# Patient Record
Sex: MALE | Race: WHITE
[De-identification: names, ages, dates, MRNs, and addresses within clinical notes are randomized per-mention and may not be internally consistent; named-entity substitution may affect disease eponyms.]

---

## 2021-02-12 ENCOUNTER — HOSPITAL ENCOUNTER (EMERGENCY)
Dept: HOSPITAL 8 - ED | Age: 51
LOS: 1 days | Discharge: HOME | End: 2021-02-13
Payer: SELF-PAY

## 2021-02-12 VITALS — DIASTOLIC BLOOD PRESSURE: 89 MMHG | SYSTOLIC BLOOD PRESSURE: 132 MMHG

## 2021-02-12 VITALS — BODY MASS INDEX: 27.41 KG/M2 | HEIGHT: 75 IN | WEIGHT: 220.46 LBS

## 2021-02-12 DIAGNOSIS — L03.116: Primary | ICD-10-CM

## 2021-02-12 DIAGNOSIS — F17.210: ICD-10-CM

## 2021-02-12 DIAGNOSIS — M79.89: ICD-10-CM

## 2021-02-12 DIAGNOSIS — M79.605: ICD-10-CM

## 2021-02-12 PROCEDURE — 99284 EMERGENCY DEPT VISIT MOD MDM: CPT

## 2021-02-12 PROCEDURE — 99406 BEHAV CHNG SMOKING 3-10 MIN: CPT

## 2021-02-13 NOTE — NUR
PT ESCORTED OUT WITH SECURITY, THREW DC PAPERS AT SECURITY REFUSED PO ABX AND 
SWUNG AT RN WHILE ATTEMPTING TO REMOVE BP CUFF

## 2021-02-15 ENCOUNTER — HOSPITAL ENCOUNTER (EMERGENCY)
Dept: HOSPITAL 8 - ED | Age: 51
Discharge: HOME | End: 2021-02-15
Payer: SELF-PAY

## 2021-02-15 VITALS — SYSTOLIC BLOOD PRESSURE: 146 MMHG | DIASTOLIC BLOOD PRESSURE: 95 MMHG

## 2021-02-15 VITALS — BODY MASS INDEX: 23.19 KG/M2 | WEIGHT: 186.51 LBS | HEIGHT: 75 IN

## 2021-02-15 DIAGNOSIS — F17.200: ICD-10-CM

## 2021-02-15 DIAGNOSIS — L03.116: Primary | ICD-10-CM

## 2021-02-15 DIAGNOSIS — Z72.9: ICD-10-CM

## 2021-02-15 PROCEDURE — 99283 EMERGENCY DEPT VISIT LOW MDM: CPT

## 2021-07-16 ENCOUNTER — HOSPITAL ENCOUNTER (EMERGENCY)
Dept: HOSPITAL 8 - ED | Age: 51
LOS: 1 days | Discharge: HOME | End: 2021-07-17
Payer: SELF-PAY

## 2021-07-16 VITALS — DIASTOLIC BLOOD PRESSURE: 88 MMHG | SYSTOLIC BLOOD PRESSURE: 132 MMHG

## 2021-07-16 VITALS — HEIGHT: 75 IN | BODY MASS INDEX: 23.57 KG/M2 | WEIGHT: 189.6 LBS

## 2021-07-16 DIAGNOSIS — F10.139: Primary | ICD-10-CM

## 2021-07-16 DIAGNOSIS — R00.0: ICD-10-CM
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
## 2021-07-16 DIAGNOSIS — R45.4: ICD-10-CM

## 2021-07-16 DIAGNOSIS — Z72.9: ICD-10-CM

## 2021-07-16 LAB
ALBUMIN SERPL-MCNC: 3.8 G/DL (ref 3.4–5)
ANION GAP SERPL CALC-SCNC: 4 MMOL/L (ref 5–15)
BASOPHILS # BLD AUTO: 0.1 X10^3/UL (ref 0–0.1)
BASOPHILS NFR BLD AUTO: 1 % (ref 0–1)
CALCIUM SERPL-MCNC: 8.6 MG/DL (ref 8.5–10.1)
CHLORIDE SERPL-SCNC: 106 MMOL/L (ref 98–107)
CREAT SERPL-MCNC: 0.93 MG/DL (ref 0.7–1.3)
EOSINOPHIL # BLD AUTO: 0.4 X10^3/UL (ref 0–0.4)
EOSINOPHIL NFR BLD AUTO: 6 % (ref 1–7)
ERYTHROCYTE [DISTWIDTH] IN BLOOD BY AUTOMATED COUNT: 13.2 % (ref 9.4–14.8)
LYMPHOCYTES # BLD AUTO: 2 X10^3/UL (ref 1–3.4)
LYMPHOCYTES NFR BLD AUTO: 30 % (ref 22–44)
MCH RBC QN AUTO: 30.6 PG (ref 27.5–34.5)
MCHC RBC AUTO-ENTMCNC: 34.2 G/DL (ref 33.2–36.2)
MONOCYTES # BLD AUTO: 0.5 X10^3/UL (ref 0.2–0.8)
MONOCYTES NFR BLD AUTO: 8 % (ref 2–9)
NEUTROPHILS # BLD AUTO: 3.8 X10^3/UL (ref 1.8–6.8)
NEUTROPHILS NFR BLD AUTO: 56 % (ref 42–75)
PLATELET # BLD AUTO: 284 X10^3/UL (ref 130–400)
PMV BLD AUTO: 8.2 FL (ref 7.4–10.4)
RBC # BLD AUTO: 4.92 X10^6/UL (ref 4.38–5.82)

## 2021-07-16 PROCEDURE — 80048 BASIC METABOLIC PNL TOTAL CA: CPT

## 2021-07-16 PROCEDURE — G0480 DRUG TEST DEF 1-7 CLASSES: HCPCS

## 2021-07-16 PROCEDURE — 82040 ASSAY OF SERUM ALBUMIN: CPT

## 2021-07-16 PROCEDURE — 93005 ELECTROCARDIOGRAM TRACING: CPT

## 2021-07-16 PROCEDURE — 85025 COMPLETE CBC W/AUTO DIFF WBC: CPT

## 2021-07-16 PROCEDURE — 99284 EMERGENCY DEPT VISIT MOD MDM: CPT

## 2021-07-16 PROCEDURE — 36415 COLL VENOUS BLD VENIPUNCTURE: CPT

## 2021-07-16 PROCEDURE — 80320 DRUG SCREEN QUANTALCOHOLS: CPT
